# Patient Record
(demographics unavailable — no encounter records)

---

## 2024-10-24 NOTE — HISTORY OF PRESENT ILLNESS
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  Recently he had dysuria and urinary frequency every hour.  He had Bactrim at home which she took and the symptoms have improved.  Residual urine today was minimal:  Previous notes: Hematuria workup with cystoscopy in August 2023 showed enlarged prostate.  CT urogram showed a left renal cyst and enlarged prostate.  Nocturia is once.  Sometimes urinary flow is slow in the morning.  Residual urine volume today was 110 mL.  Urine culture and cytology were negative.  He is on dutasteride. He has previous history of microscopic hematuria, urinary symptoms and erectile dysfunction.  He is using tadalafil 20 mg as needed.  In January 2023 PSA level was 0.67.  Urinalysis was done twice which showed 8 red blood cells.

## 2024-10-24 NOTE — PHYSICAL EXAM
[Urethral Meatus] : meatus normal [Penis Abnormality] : normal circumcised penis [Urinary Bladder Findings] : the bladder was normal on palpation [Scrotum] : the scrotum was normal [Testes Mass (___cm)] : there were no testicular masses [Prostate Tenderness] : the prostate was not tender [No Prostate Nodules] : no prostate nodules [Prostate Enlarged] : was enlarged [FreeTextEntry1] :  Genital exam was done previously [General Appearance - Well Developed] : well developed [General Appearance - Well Nourished] : well nourished [Normal Appearance] : normal appearance [Well Groomed] : well groomed [] : no respiratory distress [Exaggerated Use Of Accessory Muscles For Inspiration] : no accessory muscle use [Abdomen Soft] : soft [Abdomen Tenderness] : non-tender [Oriented To Time, Place, And Person] : oriented to person, place, and time [Affect] : the affect was normal [Mood] : the mood was normal [Not Anxious] : not anxious

## 2024-10-24 NOTE — ASSESSMENT
[FreeTextEntry1] :  his symptoms have already returned back to baseline.  He already took Bactrim.  Urinalysis and urine culture will be checked.  We will discuss results on the phone.  Otherwise, he will follow-up in a few months.  Juan Manuel Reyes MD, FACS [facility]  of Urology [address] [place] [address], [address] [place] [address] Tel: [phone] Fax: [phone]

## 2024-11-04 NOTE — HISTORY OF PRESENT ILLNESS
[FreeTextEntry1] : This is a 71 y/o male with a pmhx of CAD, HLD, HTN who presents for follow up. Last visit 08/26/2024, had recent holter 7/29/24 with frequent PVC, runs of SVT and 2 runs of Vtach, s/p CTCA 8/23 with CAC 1530, calcified plaque in Prox-mid RCA with mild stenosis.  Echo 8/12/24 with normal EF 62%, mild LVD dysfunction. left atrium moderately dilated. Aortic root borderline dilated 3.8cm. Pt states that is still having numbness of b/l feet. States that it doesn't affect daily activates and actually improves with movement.  Patient denies chest pain, dyspnea, palpitations, dizziness, syncope, changes in bowel/bladder habits or appetite

## 2025-01-08 NOTE — HISTORY OF PRESENT ILLNESS
[de-identified] : This is a 70 year old man with a history Hx of MGUS.  Initially found to have an IgG lambda  M spike  1.2g/dl in 2020. This was rechecked in 2023 April and was noted to be 1.7g/dl on initial presentation.  Thus far patient thats not exhibited any myeloma defining symptoms of renal failure, or anemia. IgG M spiek while high was mostly stabel in value 1,2-1.7 t its peak.  Skeletal surveys 2020 were normal.  Bone marrow biopsy June 2023 showed 5% plasma cells.   [de-identified] :  Patient presents for continued follow up of high risk MGUS.  Hg down to 10.3g/dl, lower than prior, creatinine still 1.35 not that severe. Elevated MGUS  in the 1.7-1.8 range.  patient has been fowling colder, and has more back pain 4/10 lately, not severe but enough to take notice.   Hx of PET CT scan June2024 unremarkable and bone marrow biopsy Juen 2023 that was only 5% plasma cells.    Zenon repeat PET CT scan first followed by possible bone marrow rule out myeloma once gain.

## 2025-01-08 NOTE — ASSESSMENT
[FreeTextEntry1] :     This is a 70 year old man with history of an MGUS and a-/a- thalassemia trait, followed for many years. Fat Pad biopsy and Bone marrow biopsy negative. Awaiting delivery of paper chart to review. Hg rising slowly, now 11.9g/dl.   High risk MGUS M spike 1.7 at the time of bone marrow biopsy in June 2023 that demonstrated only 5% clonal plasma cells.  Given decline in hg 10.3 now, with history of increasing back pain 4/10, will repeat imaging via PET CT scan, rule out lytic lesions. Explained to patient that findings may lead to the recommendation of a repeat bone marrow biopsy.

## 2025-01-08 NOTE — HISTORY OF PRESENT ILLNESS
[de-identified] : This is a 70 year old man with a history Hx of MGUS.  Initially found to have an IgG lambda  M spike  1.2g/dl in 2020. This was rechecked in 2023 April and was noted to be 1.7g/dl on initial presentation.  Thus far patient thats not exhibited any myeloma defining symptoms of renal failure, or anemia. IgG M spiek while high was mostly stabel in value 1,2-1.7 t its peak.  Skeletal surveys 2020 were normal.  Bone marrow biopsy June 2023 showed 5% plasma cells.   [de-identified] :  Patient presents for continued follow up of high risk MGUS.  Hg down to 10.3g/dl, lower than prior, creatinine still 1.35 not that severe. Elevated MGUS  in the 1.7-1.8 range.  patient has been fowling colder, and has more back pain 4/10 lately, not severe but enough to take notice.   Hx of PET CT scan June2024 unremarkable and bone marrow biopsy Juen 2023 that was only 5% plasma cells.    Zenon repeat PET CT scan first followed by possible bone marrow rule out myeloma once gain.

## 2025-02-18 NOTE — HISTORY OF PRESENT ILLNESS
[FreeTextEntry1] : COVID 2020, no hospitilizations  COVID VACCINE FULL.  HPI interval [id number]: 70 year old male presents today for follow up visit with his wife. He has retired from working as a Physician. His memory is stable. No changes in adls or iadls. No hallucinations or falls. No changes in behavior. Denies depression and anxiety. No recent falls or hospitalizations. He is forgetful and repeating himself many times.   HPI: 70 year old male presents today for initial cognitive evaluation with his wife and daughter. He is more forgetful x  when had COVID. Daughter has noticed decline since COVID. 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 No recent hospitalizations or falls. No hallucinations or delusions. He is more stubborn and resistant to being told what to do. Family history of Dementia- his mother. He went on a family vacation and got confused about the purpose of the trip. He's mixing up names of his children and siblings. he has become obsessed with locking doors and is more paranoid.    PMH: HTN, HLD, GERD, BPH   -Memory: STM loss   -Speech: some stuttering, word finding difficulty, needs things repeated due to poor comprehension   -Orientation: ok -Praxis: ok  -Decision making/Executive fx/Multitasking: good   -Sleep: good     -Appetite: good  -Motor symptoms: None  -B/B: none  -Psychiatric symptoms: None     -Functional status:   [place] Index of [place] in Activities of Daily Livin. Bathing/Showerin  2. Dressin  3. Toiletin   4. Transferrin 5. Continence:  1 6: Feedin   TOTAL:  6       [place] Instrumental Activities of Daily Living:  A. Ability to Use Telephone: 1   B. Shoppin C. Food Preparation: 1    D. Housekeepin   E. Laundry: 1 F. Transportation: 1    G. Responsibility for Own Medications: 1  H: Ability to Handle Finances:  0 (wife always did that)  TOTAL: 7   CDR: 0.5   -Professional status:  Infectious Disease Doctor   PCP and other physicians:  -PCP: Dr. Víctor Puckett   -Neuro: Dr. Nissenbaum  Workup done: MRI : unremarkable

## 2025-02-18 NOTE — REVIEW OF SYSTEMS
[Confused or Disoriented] : confusion [Memory Lapses or Loss] : memory loss [Decr. Concentrating Ability] : decreased concentrating ability [Difficulty with Language] : ~M difficulty with language [Repeating Questions] : repeated questioning about recent events [Seizures] : convulsions [Joint Pain] : joint pain [Negative] : Heme/Lymph [Changed Thought Patterns] : no change in thought patterns [Facial Weakness] : no facial weakness [Arm Weakness] : no arm weakness [Hand Weakness] : no hand weakness [Leg Weakness] : no leg weakness [Poor Coordination] : good coordination [Difficulty Writing] : no difficulty writing [Difficulties in Speech] : no speech difficulties [Numbness] : no numbness [Tingling] : no tingling [Abnormal Sensation] : no abnormal sensation [Hypersensitivity] : no hypersensitivity [Dizziness] : no dizziness [Fainting] : no fainting [Lightheadedness] : no lightheadedness [Vertigo] : no vertigo [Cluster Headache] : no cluster headache [Migraine Headache] : no migraine headache [Tension Headache] : no tension-type headache [Difficulty Walking] : no difficulty walking [Inability to Walk] : able to walk [Ataxia] : no ataxia [Frequent Falls] : not falling [Limping] : not limping

## 2025-02-18 NOTE — DATA REVIEWED
[de-identified] : EXAM: [id number] - MR BRAIN - ORDERED BY: MICHAEL A NISSENBAUM PROCEDURE DATE: 08/21/2023    INTERPRETATION: Non-contrast MRI of the brain.  CLINICAL INDICATION: Memory loss  TECHNIQUE: Multiplanar, multisequence MR images of the brain were obtained without the administration of intravenous contrast.  COMPARISON: Brain MRI dated 8/27/2022  FINDINGS:No acute hemorrhage or infarct is identified.  Age-appropriate involutional and mild microvascular ischemic changes are similar in appearance.  The orbits are not remarkable in appearance.  The flow voids at the skull base are within normal limits.  The visualized paranasal sinuses and tympanomastoid cavities are free of acute disease.  IMPRESSION:  No acute hemorrhage, mass effect or extra-axial collections and no change compared with prior study dated 8/27/2022  --- End of Report ---       DEYSI ALAMO MD; Attending Radiologist This document has been electronically signed. Aug 21 2023 5:11PM

## 2025-02-18 NOTE — DATA REVIEWED
[de-identified] : EXAM: [id number] - MR BRAIN - ORDERED BY: MICHAEL A NISSENBAUM PROCEDURE DATE: 08/21/2023    INTERPRETATION: Non-contrast MRI of the brain.  CLINICAL INDICATION: Memory loss  TECHNIQUE: Multiplanar, multisequence MR images of the brain were obtained without the administration of intravenous contrast.  COMPARISON: Brain MRI dated 8/27/2022  FINDINGS:No acute hemorrhage or infarct is identified.  Age-appropriate involutional and mild microvascular ischemic changes are similar in appearance.  The orbits are not remarkable in appearance.  The flow voids at the skull base are within normal limits.  The visualized paranasal sinuses and tympanomastoid cavities are free of acute disease.  IMPRESSION:  No acute hemorrhage, mass effect or extra-axial collections and no change compared with prior study dated 8/27/2022  --- End of Report ---       DEYSI ALAMO MD; Attending Radiologist This document has been electronically signed. Aug 21 2023 5:11PM

## 2025-02-18 NOTE — PHYSICAL EXAM
[General Appearance - Alert] : alert [Oriented To Time, Place, And Person] : oriented to person, place, and time [Affect] : the affect was normal [Person] : oriented to person [Place] : oriented to place [Time] : oriented to time [Remote Intact] : remote memory intact [Registration Intact] : recent registration memory intact [Concentration Intact] : normal concentrating ability [Visual Intact] : visual attention was ~T not ~L decreased [Naming Objects] : no difficulty naming common objects [Repeating Phrases] : no difficulty repeating a phrase [Writing A Sentence] : no difficulty writing a sentence [Fluency] : fluency intact [Reading] : reading intact [Vocabulary] : adequate range of vocabulary [Total Score ___ / 30] : the patient achieved a score of [unfilled] /30 [Date / Time ___ / 5] : date / time [unfilled] / 5 [Place ___ / 5] : place [unfilled] / 5 [Registration ___ / 3] : registration [unfilled] / 3 [Serial Sevens ___/5] : serial sevens [unfilled] / 5 [Naming 2 Objects ___ / 2] : naming two objects [unfilled] / 2 [Repeating a Sentence ___ / 1] : repeating a sentence [unfilled] / 1 [Writing a Sentence ___ / 1] : write sentence [unfilled] / 1 [3-stage Verbal Command ___ / 3] : three-stage verbal command [unfilled] / 3 [Written Command ___ / 1] : written command [unfilled] / 1 [Copy a Design ___ / 1] : copy a design [unfilled] / 1 [Recall ___ / 3] : recall [unfilled] / 3 [Cranial Nerves Optic (II)] : visual acuity intact bilaterally,  visual fields full to confrontation, pupils equal round and reactive to light [Cranial Nerves Oculomotor (III)] : extraocular motion intact [Cranial Nerves Trigeminal (V)] : facial sensation intact symmetrically [Cranial Nerves Facial (VII)] : face symmetrical [Cranial Nerves Vestibulocochlear (VIII)] : hearing was intact bilaterally [Cranial Nerves Glossopharyngeal (IX)] : tongue and palate midline [Cranial Nerves Accessory (XI - Cranial And Spinal)] : head turning and shoulder shrug symmetric [Cranial Nerves Hypoglossal (XII)] : there was no tongue deviation with protrusion [Motor Tone] : muscle tone was normal in all four extremities [Motor Strength] : muscle strength was normal in all four extremities [Abnormal Walk] : normal gait [2+] : Ankle jerk left 2+ [Sclera] : the sclera and conjunctiva were normal [PERRL With Normal Accommodation] : pupils were equal in size, round, reactive to light, with normal accommodation [Extraocular Movements] : extraocular movements were intact [Full Visual Field] : full visual field [Short Term Intact] : short term memory impaired [Span Intact] : the attention span was decreased [Comprehension] : comprehension not intact [Current Events] : inadequate knowledge of current events [Past History] : inadequate knowledge of personal past history [Motor Strength Upper Extremities Bilaterally] : strength was normal in both upper extremities [Motor Strength Lower Extremities Bilaterally] : strength was normal in both lower extremities [Romberg's Sign] : Romberg's sign was negtive [Limited Balance] : balance was intact [Tremor] : no tremor present [FreeTextEntry4] : Mental Status Exam   Presidents: 2/5 Alternating Pattern: ok Spiral: ok Clock: 3/3 Repetition: ok Trail A: B:  Fluency: A: 5 Animals: 8 Go-No-Go: ok R/L discrimination on self and examiner: ok Cross-line commands: ok Praxis: ok - Motor: ok -Dynamic/Luria: ok -Ideomotor/Imitation:  ok -Ideational/writing/closing-in: ok -Dressing: ok

## 2025-02-18 NOTE — ASSESSMENT
[FreeTextEntry1] : Assessment: 70 year old male with pmh htn, hld, GERD. Independent in adls and iadls. 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 Visual spatial skills intact. Poor recall of words. Able to draw clock with correct time and write a sentence. He has difficulty understanding conversations and needs things repeated multiple times. He can not hold onto information for long/retain information.       Diagnostic Impression:     -memory loss -cognitive and behavioral changes   Plan: - Provdied lecanemab education -Working up for aamoab infusion -MRI 3D Dementia -Apoe -npt -Started Donepezil 10 mg, discussed side effects, will get back to me in a month on how tolerating -Educated on importance of lifestyle modifications such as daily exercise, healthy balanced diet and good sleep habits  -[phone] wife

## 2025-02-18 NOTE — REASON FOR VISIT
[Initial Evaluation] : an initial evaluation [Follow-Up: _____] : a [unfilled] follow-up visit [Spouse] : spouse [FreeTextEntry1] : memory loss

## 2025-02-18 NOTE — HISTORY OF PRESENT ILLNESS
[FreeTextEntry1] : COVID 2020, no hospitilizations  COVID VACCINE FULL.  HPI interval [id number]: 70 year old male presents today for follow up visit with his wife. He has retired from working as a Physician. His memory is stable. No changes in adls or iadls. No hallucinations or falls. No changes in behavior. Denies depression and anxiety. No recent falls or hospitalizations. He is forgetful and repeating himself many times.   HPI: 70 year old male presents today for initial cognitive evaluation with his wife and daughter. He is more forgetful x  when had COVID. Daughter has noticed decline since COVID. 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 No recent hospitalizations or falls. No hallucinations or delusions. He is more stubborn and resistant to being told what to do. Family history of Dementia- his mother. He went on a family vacation and got confused about the purpose of the trip. He's mixing up names of his children and siblings. he has become obsessed with locking doors and is more paranoid.    PMH: HTN, HLD, GERD, BPH   -Memory: STM loss   -Speech: some stuttering, word finding difficulty, needs things repeated due to poor comprehension   -Orientation: ok -Praxis: ok  -Decision making/Executive fx/Multitasking: good   -Sleep: good     -Appetite: good  -Motor symptoms: None  -B/B: none  -Psychiatric symptoms: None     -Functional status:   [place] Index of [place] in Activities of Daily Livin. Bathing/Showerin  2. Dressin  3. Toiletin   4. Transferrin 5. Continence:  1 6: Feedin   TOTAL:  6       [place] Instrumental Activities of Daily Living:  A. Ability to Use Telephone: 1   B. Shoppin C. Food Preparation: 1    D. Housekeepin   E. Laundry: 1 F. Transportation: 1    G. Responsibility for Own Medications: 1  H: Ability to Handle Finances:  0 (wife always did that)  TOTAL: 7   CDR: 0.5   -Professional status:  Infectious Disease Doctor   PCP and other physicians:  -PCP: Dr. Víctor Puckett   -Neuro: Dr. Nissenbaum  Workup done: MRI : unremarkable

## 2025-02-18 NOTE — ASSESSMENT
[FreeTextEntry1] : Assessment: 70 year old male with pmh htn, hld, GERD. Independent in adls and iadls. 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 Visual spatial skills intact. Poor recall of words. Able to draw clock with correct time and write a sentence. He has difficulty understanding conversations and needs things repeated multiple times. He can not hold onto information for long/retain information.       Diagnostic Impression:     -memory loss -cognitive and behavioral changes   Plan: - Provdied lecanemab education -Working up for aamoab infusion -MRI 3D Dementia -Apoe -npt -Started Donepezil 10 mg, discussed side effects, will get back to me in a month on how tolerating -Educated on importance of lifestyle modifications such as daily exercise, healthy balanced diet and good sleep habits  -[phone] wife

## 2025-03-10 NOTE — PHYSICAL EXAM
[Urethral Meatus] : meatus normal [Penis Abnormality] : normal circumcised penis [Urinary Bladder Findings] : the bladder was normal on palpation [Scrotum] : the scrotum was normal [Testes Mass (___cm)] : there were no testicular masses [Prostate Tenderness] : the prostate was not tender [No Prostate Nodules] : no prostate nodules [Prostate Enlarged] : was enlarged [FreeTextEntry1] : Exam was done previously [General Appearance - Well Developed] : well developed [General Appearance - Well Nourished] : well nourished [Normal Appearance] : normal appearance [Well Groomed] : well groomed [] : no respiratory distress [Exaggerated Use Of Accessory Muscles For Inspiration] : no accessory muscle use [Abdomen Soft] : soft [Abdomen Tenderness] : non-tender [Normal Station and Gait] : the gait and station were normal for the patient's age [Oriented To Time, Place, And Person] : oriented to person, place, and time [Affect] : the affect was normal [Mood] : the mood was normal [Not Anxious] : not anxious

## 2025-03-10 NOTE — HISTORY OF PRESENT ILLNESS
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  Previous urine symptoms have resolved.  Urine culture was negative.  PSA level was 0.45 in November 2024.  Residual urine today was 55 mL.  Nocturia is once or twice.  He is on dutasteride.  Urinalysis showed 7 red blood cells.  Previous notes: Hematuria workup with cystoscopy in August 2023 showed enlarged prostate.  CT urogram showed a left renal cyst and enlarged prostate.  Urine culture and cytology were negative.  He was tadalafil in the past.  In January 2023 PSA level was 0.67.  Urinalysis was done twice which showed 8 red blood cells.

## 2025-03-10 NOTE — ASSESSMENT
[FreeTextEntry1] :   His exam is unchanged.  He empties bladder well.  Residual urine was minimal.  PSA level was within normal range.  Dutasteride was refilled and he can follow-up in 1 year.  He has undergone hematuria workup previously.  Juan Manuel Reyes MD, FACS [facility]  of Urology [address] [place] [address], [address] [place] [address] Tel: [phone] Fax: [phone]

## 2025-03-17 NOTE — HISTORY OF PRESENT ILLNESS
[FreeTextEntry1] : pt presents for f/u pt with memory issues .pt on aricept stable .pt retired from being a physician .pt also with mgus with mild worsening anemia saw dr lucio .pt otherwise well pt denies any chest  pain dizziness ,lightheadedness ,nausea vomiting diaphoresis

## 2025-04-16 NOTE — HISTORY OF PRESENT ILLNESS
[de-identified] : This is a 71 year old man with a history Hx of MGUS.  Initially found to have an IgG lambda  M spike  1.2g/dl in 2020. This was rechecked in 2023 April and was noted to be 1.7g/dl on initial presentation.  Thus far patient thats not exhibited any myeloma defining symptoms of renal failure, or anemia. IgG M spiek while high was mostly stabel in value 1,2-1.7 t its peak.  Skeletal surveys 2020 were normal.  Bone marrow biopsy June 2023 showed 5% plasma cells.   [de-identified] : Hg 11.7g/dl this morning was 11.4g/dl in March with Dr Paiz.   M spike 1.5g/dl in March at Dr. Pucektt's office IgG lambda.   LE neuropathies present, remain about the same.    Creatinine had been climbing from normal in 2023 to 1.35 in 2024, now 1.69 in march.   BLood pressure has been mostly ok in the 120's/70's range.   Denies body aches or bone pains.    MRI braine demosntrated mild white matter chagnes but nothing severe.   PET CT scan 1/20/25 demonstrated left glenohumeral region uptake which appeared to be more of a rotator cuff abnormality than a lytic lesion on MRI.  MRI findings consitant with a adhesive capsultitis.  orresponding to PET/CT findings is mild enhancement in the region of the anterior inferior glenohumeral ligament, more conspicuous compared with the prior exam. Findings could reflect adhesive capsulitis.  No abnormal marrow signal or enhancement.

## 2025-04-16 NOTE — HISTORY OF PRESENT ILLNESS
[de-identified] : This is a 71 year old man with a history Hx of MGUS.  Initially found to have an IgG lambda  M spike  1.2g/dl in 2020. This was rechecked in 2023 April and was noted to be 1.7g/dl on initial presentation.  Thus far patient thats not exhibited any myeloma defining symptoms of renal failure, or anemia. IgG M spiek while high was mostly stabel in value 1,2-1.7 t its peak.  Skeletal surveys 2020 were normal.  Bone marrow biopsy June 2023 showed 5% plasma cells.   [de-identified] : Hg 11.7g/dl this morning was 11.4g/dl in March with Dr Paiz.   M spike 1.5g/dl in March at Dr. Puckett's office IgG lambda.   LE neuropathies present, remain about the same.    Creatinine had been climbing from normal in 2023 to 1.35 in 2024, now 1.69 in march.   BLood pressure has been mostly ok in the 120's/70's range.   Denies body aches or bone pains.    MRI braine demosntrated mild white matter chagnes but nothing severe.   PET CT scan 1/20/25 demonstrated left glenohumeral region uptake which appeared to be more of a rotator cuff abnormality than a lytic lesion on MRI.  MRI findings consitant with a adhesive capsultitis.  orresponding to PET/CT findings is mild enhancement in the region of the anterior inferior glenohumeral ligament, more conspicuous compared with the prior exam. Findings could reflect adhesive capsulitis.  No abnormal marrow signal or enhancement.

## 2025-04-16 NOTE — ASSESSMENT
[FreeTextEntry1] :     This is a 10 year old man with history of an MGUS and a-/a- thalassemia trait, followed for many years. Fat Pad biopsy and Bone marrow biopsy negative. Awaiting delivery of paper chart to review. Hg rising slowly, now 11.9g/dl.   High risk MGUS M spike maixmal IgG lambda M spike 1.7.  PET CT scan ordered as a reqaction to this was nebatibe except for a glenohumoral lesion hwich was foudn to be benign non MRI representing adhesive capsultiis.   IgG lambda M spiek emains 1.6 curently , slithgly lower than rpeviosu peak. No signs and symptoms of progression to multiple myeloma.

## 2025-05-09 NOTE — PHYSICAL EXAM
[Urethral Meatus] : meatus normal [Penis Abnormality] : normal circumcised penis [Urinary Bladder Findings] : the bladder was normal on palpation [Scrotum] : the scrotum was normal [Testes Mass (___cm)] : there were no testicular masses [Prostate Tenderness] : the prostate was not tender [No Prostate Nodules] : no prostate nodules [Prostate Enlarged] : was enlarged [FreeTextEntry1] : Genital exam was done in the past [General Appearance - Well Developed] : well developed [General Appearance - Well Nourished] : well nourished [Normal Appearance] : normal appearance [Well Groomed] : well groomed [] : no respiratory distress [Abdomen Soft] : soft [Abdomen Tenderness] : non-tender [Normal Station and Gait] : the gait and station were normal for the patient's age [Oriented To Time, Place, And Person] : oriented to person, place, and time [Affect] : the affect was normal [Mood] : the mood was normal [Not Anxious] : not anxious

## 2025-05-09 NOTE — ASSESSMENT
[FreeTextEntry1] : He empties bladder well.  Urine studies will be sent.  Unfortunately he already started Bactrim so urine culture may not show obvious evidence of infection.  Pyridium was also sent.  We will discuss results on the phone.  He will continue with dutasteride.

## 2025-05-09 NOTE — HISTORY OF PRESENT ILLNESS
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  In the last few days he has developed urinary frequency, urgency and dysuria.  He also has burning without urination.  He started himself on Bactrim at home.  There is no fever or chills.  Residual urine today was 90 mL.  He is on dutasteride.  Usually nocturia is once. PSA level was 0.45 in November 2024.    Previous notes: Hematuria workup with cystoscopy in August 2023 showed enlarged prostate.  CT urogram showed a left renal cyst and enlarged prostate.  Urine culture and cytology were negative.  He was tadalafil in the past.  In January 2023 PSA level was 0.67.  Urinalysis was done twice which showed 8 red blood cells.